# Patient Record
Sex: FEMALE | Race: WHITE | NOT HISPANIC OR LATINO | Employment: UNEMPLOYED | URBAN - METROPOLITAN AREA
[De-identification: names, ages, dates, MRNs, and addresses within clinical notes are randomized per-mention and may not be internally consistent; named-entity substitution may affect disease eponyms.]

---

## 2015-03-25 LAB — PAP-ABSTRACT: NORMAL

## 2019-05-17 ENCOUNTER — OFFICE VISIT (OUTPATIENT)
Dept: FAMILY MEDICINE | Facility: CLINIC | Age: 44
End: 2019-05-17
Payer: COMMERCIAL

## 2019-05-17 VITALS
HEIGHT: 58 IN | BODY MASS INDEX: 35.1 KG/M2 | DIASTOLIC BLOOD PRESSURE: 74 MMHG | HEART RATE: 65 BPM | SYSTOLIC BLOOD PRESSURE: 109 MMHG | WEIGHT: 167.2 LBS | TEMPERATURE: 98.6 F

## 2019-05-17 DIAGNOSIS — F41.1 GENERALIZED ANXIETY DISORDER: ICD-10-CM

## 2019-05-17 DIAGNOSIS — F19.20 CHEMICAL DEPENDENCY (H): ICD-10-CM

## 2019-05-17 DIAGNOSIS — R06.83 SNORING: Primary | ICD-10-CM

## 2019-05-17 DIAGNOSIS — F33.41 RECURRENT MAJOR DEPRESSIVE DISORDER, IN PARTIAL REMISSION (H): ICD-10-CM

## 2019-05-17 LAB
HCG UR QL: NEGATIVE
TSH SERPL DL<=0.005 MIU/L-ACNC: 2.06 MU/L (ref 0.4–4)

## 2019-05-17 RX ORDER — CITALOPRAM HYDROBROMIDE 40 MG/1
40 TABLET ORAL DAILY
Qty: 90 TABLET | Refills: 1 | Status: SHIPPED | OUTPATIENT
Start: 2019-05-17 | End: 2019-05-20

## 2019-05-17 RX ORDER — QUETIAPINE FUMARATE 25 MG/1
25 TABLET, FILM COATED ORAL AT BEDTIME
Qty: 90 TABLET | Refills: 1 | Status: SHIPPED | OUTPATIENT
Start: 2019-05-17 | End: 2019-05-20

## 2019-05-17 ASSESSMENT — ANXIETY QUESTIONNAIRES
2. NOT BEING ABLE TO STOP OR CONTROL WORRYING: MORE THAN HALF THE DAYS
6. BECOMING EASILY ANNOYED OR IRRITABLE: SEVERAL DAYS
5. BEING SO RESTLESS THAT IT IS HARD TO SIT STILL: NOT AT ALL
3. WORRYING TOO MUCH ABOUT DIFFERENT THINGS: MORE THAN HALF THE DAYS
IF YOU CHECKED OFF ANY PROBLEMS ON THIS QUESTIONNAIRE, HOW DIFFICULT HAVE THESE PROBLEMS MADE IT FOR YOU TO DO YOUR WORK, TAKE CARE OF THINGS AT HOME, OR GET ALONG WITH OTHER PEOPLE: NOT DIFFICULT AT ALL
GAD7 TOTAL SCORE: 8
1. FEELING NERVOUS, ANXIOUS, OR ON EDGE: MORE THAN HALF THE DAYS
7. FEELING AFRAID AS IF SOMETHING AWFUL MIGHT HAPPEN: NOT AT ALL

## 2019-05-17 ASSESSMENT — ENCOUNTER SYMPTOMS
PSYCHIATRIC NEGATIVE: 1
EYES NEGATIVE: 1
ENDOCRINE NEGATIVE: 1
DYSPHORIC MOOD: 0
FEVER: 0
FATIGUE: 0
NEUROLOGICAL NEGATIVE: 1
CARDIOVASCULAR NEGATIVE: 1
MUSCULOSKELETAL NEGATIVE: 1
RESPIRATORY NEGATIVE: 1
CONSTITUTIONAL NEGATIVE: 1

## 2019-05-17 ASSESSMENT — PATIENT HEALTH QUESTIONNAIRE - PHQ9
5. POOR APPETITE OR OVEREATING: SEVERAL DAYS
SUM OF ALL RESPONSES TO PHQ QUESTIONS 1-9: 5

## 2019-05-17 ASSESSMENT — MIFFLIN-ST. JEOR: SCORE: 1308.16

## 2019-05-17 NOTE — PATIENT INSTRUCTIONS
"Here is the plan from today's visit    1. Recurrent major depressive disorder, in partial remission (H)  increase  - citalopram (CELEXA) 40 MG tablet; Take 1 tablet (40 mg) by mouth daily  Dispense: 90 tablet; Refill: 1  - TSH with free T4 reflex    2. Generalized anxiety disorder    - citalopram (CELEXA) 40 MG tablet; Take 1 tablet (40 mg) by mouth daily  Dispense: 90 tablet; Refill: 1  - TSH with free T4 reflex    3. Snoring  Needs an appointment   - SLEEP EVALUATION & MANAGEMENT REFERRAL - [facility] / [facility]  [phone] (Age 2 and up); Future  - TSH with free T4 reflex    4. Chemical dependency (H)    - HIV Antigen Antibody Combo  - Hepatitis A Antibody IgG  - Hepatitis A antibody IgM  - Hepatitis B Surface Antibody  - Hepatitis B surface antigen  - Hepatitis C antibody  - Quantiferon TB Gold Plus  - HCG Qualitative Urine (UPT) ([facility])    Recommendations for Insomnia  1. Only go to bed when sleepy.  2. If unable to fall asleep within 20 minutes, get out of bed, go to a different room and read, preferable something uninteresting.  3. Remove or cover up the clock in your bedroom.  3. Avoid caffeine 6 hours before bed.  4. Avoid watching TV one hour before bed time especially not  in bed or leaving the TV on all night.  5. Designate the hour before your bed time as \"worry time.\" Keep a paper to-do list, make a new list every night before going to bed. The goal is for this list to reduce mind racing.   6. Try to exercise 30-60 minutes daily-but avoid vigorous exercise 4-6 hours before bed  7. Try a meditation exercise called the Body Scan  Body Scan Meditation  Lying down now... Breathe in s-l-o-w-l-y and deeply through your nose. Feel your abdomen move outwards as your diaphragm contracts and draws air into your lungs. Your chest should not rise noticeably.  While breathing slowly, direct attention to your left foot. Feel your foot. Curl your toes once to fix " your awareness to it. Now relax...  As you breathe in through your nostrils, slowly scan your left leg from foot to knee, and up through your thigh.   As you breathe out, trace your leg down to your foot. Do this 3 times, then take your mind off your breath and remain with your foot.  Feel the sensations in your foot. Simply become aware of them. Scan your left lower leg. Accept any tension or discomfort. Scan slowly, up through your thigh now.  If thoughts appear, that's fine. Gently come back to your breath, and shift awareness over to your right foot.   Slowly inhale while scanning through your right calf, knee, and thigh... Exhale and scan back down. S-l-o-w-l-y. Now let go of your breath and remain with your foot.  Scan for any sensation in your foot... calf... Thigh... Simply accept all sensations and feel what happens. Relax...  Now focus on your stomach. Feel it r-i-[id number]-i-[id number]-g as you breathe in. Sinking as you exhale. Nice and slow. Your heart probably slows down. This is normal. Remain aware of your stomach, your breath... up and down. Become aware of sensations. Relax...  Now follow the same procedure with your left hand and arm as you did with your leg. You may clench your fist at first to really direct your awareness to your left hand. Breathe...  Now scan up along the length of your arm, to your chest. Then down your right arm to your right hand. Remain there. Breathe. Sense and scan. Relax...  Come back up to your chest. Continue scanning up along your neck and to your face. Gently clench your jaws and release. Feel the sensations in your jaws, your throat. Breathe and scan. Feel how the back of your head rests against the floor.   Scan the top of your head. Relax...  Now detach from all body parts. Breathe... Feel how everything is connected, resting gently on the floor. Just breathe, let any sensation come to you. Accept it as a part of you. Return to your breathing. You are big, sensations are small  parts of you. They fluctuate, come and go.  Just breathe for a minute and feel your body. Then sit up slowly.    Please call or return to clinic if your symptoms don't go away.    Follow up plan  Please make a clinic appointment for follow up with me (ROGE DURANT) in 4  weeks for pap smear.    Thank you for coming to [facility] today.  Lab Testing:  **If you had lab testing today and your results are reassuring or normal they will be mailed to you or sent through [employer] within 7 days.   **If the lab tests need quick action we will call you with the results.  The phone number we will call with results is # [phone] (home) . If this is not the best number please call our clinic and change the number.  Medication Refills:  If you need any refills please call your pharmacy and they will contact us.   If you need to  your refill at a new pharmacy, please contact the new pharmacy directly. The new pharmacy will help you get your medications transferred faster.   Scheduling:  If you have any concerns about today's visit or wish to schedule another appointment please call our office during normal business hours [phone] (8-5:00 M-F)  If a referral was made to a [facility] and you don't get a call from central scheduling please call [phone].  If a Mammogram was ordered for you at [facility] call [phone] to schedule or change your appointment.  If you had an XRay/CT/Ultrasound/MRI ordered the number is [phone] to schedule or change your radiology appointment.   Medical Concerns:  If you have urgent medical concerns please call [phone] at any time of the day.    Roge Durant MD

## 2019-05-17 NOTE — LETTER
May 18, 2019      Kira Booth  [address] [place] [address]        Dear Kira,    Thank you for getting your care at [facility]. Please see below for your test results. Your labs are reassuring.  Your last pap was 5 years ago so you do need one.    Resulted Orders   HIV Antigen Antibody Combo   Result Value Ref Range    HIV Antigen Antibody Combo Nonreactive NR^Nonreactive          Comment:      HIV-1 p24 Ag & HIV-1/HIV-2 Ab Not Detected   Hepatitis A Antibody IgG   Result Value Ref Range    Hepatitis A Antibody IgG Nonreactive NR^Nonreactive      Comment:      This assay cannot be used for the diagnosis of acute HAV infection.   Hepatitis A antibody IgM   Result Value Ref Range    Hepatitis A IgM [facility] Nonreactive NR^Nonreactive      Comment:      IgM anti-HAV not detected. Does not exclude the possibility of recent exposure   to or infection with HAV.     Hepatitis B Surface Antibody   Result Value Ref Range    Hepatitis B Surface Antibody 197.90 (H) <8.00 m[IU]/mL      Comment:      Reactive, Patient is considered to be immune to infection with hepatitis B   when the value is greater than or equal to 12.0 m[IU]/mL.     Hepatitis B surface antigen   Result Value Ref Range    Hep B Surface Agn Nonreactive NR^Nonreactive   Hepatitis C antibody   Result Value Ref Range    Hepatitis C Antibody Nonreactive NR^Nonreactive      Comment:      Assay performance characteristics have not been established for newborns,   infants, and children     TSH with free T4 reflex   Result Value Ref Range    TSH 2.06 0.40 - 4.00 mU/L   HCG Qualitative Urine (UPT) ([facility])   Result Value Ref Range    HCG Qual Urine NEGATIVE Negative   ABSTRACT PAP-NO CHARGE   Result Value Ref Range    PAP-ABSTRACT See Scanned Document       Comment:      result was NIL from Care everywhere       If you have any concerns about these results please call and leave a message for me or send a "[facility]," message to the  clinic.    Sincerely,    Roge Durant MD
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
## 2019-05-17 NOTE — LETTER
May 21, 2019      Kira Booth  [address] [place] [id number]        Dear Kira,    Thank you for getting your care at [facility]. Please see below for your test results. The TB test is negative.    Resulted Orders   HIV Antigen Antibody Combo   Result Value Ref Range    HIV Antigen Antibody Combo Nonreactive NR^Nonreactive          Comment:      HIV-1 p24 Ag & HIV-1/HIV-2 Ab Not Detected   Hepatitis A Antibody IgG   Result Value Ref Range    Hepatitis A Antibody IgG Nonreactive NR^Nonreactive      Comment:      This assay cannot be used for the diagnosis of acute HAV infection.   Hepatitis A antibody IgM   Result Value Ref Range    Hepatitis A IgM [facility] Nonreactive NR^Nonreactive      Comment:      IgM anti-HAV not detected. Does not exclude the possibility of recent exposure   to or infection with HAV.     Hepatitis B Surface Antibody   Result Value Ref Range    Hepatitis B Surface Antibody 197.90 (H) <8.00 m[IU]/mL      Comment:      Reactive, Patient is considered to be immune to infection with hepatitis B   when the value is greater than or equal to 12.0 m[IU]/mL.     Hepatitis B surface antigen   Result Value Ref Range    Hep B Surface Agn Nonreactive NR^Nonreactive   Hepatitis C antibody   Result Value Ref Range    Hepatitis C Antibody Nonreactive NR^Nonreactive      Comment:      Assay performance characteristics have not been established for newborns,   infants, and children     Quantiferon TB Gold Plus   Result Value Ref Range    Quantiferon-TB Gold Plus Result Negative NEG^Negative      Comment:      No interferon gamma response to M.tuberculosis antigens was detected.   Infection with M.tuberculosis is unlikely, however a single negative result   does not exclude infection. In patients at high risk for infection, a second   test should be considered  in accordance with the 2017 ATS/IDSA/CDC Clinical Practice Guidelines for   Diagnosis of Tuberculosis in Adults and Children [Maximn  GILMA et   al.Clin.Infect.Dis. 2017 64(2):111-115].      TB1 Ag minus Nil Value 0.10 IU/mL    TB2 Ag minus Nil Value 0.14 IU/mL    Mitogen minus Nil Result >10.00 IU/mL    Nil Result 0.05 IU/mL   TSH with free T4 reflex   Result Value Ref Range    TSH 2.06 0.40 - 4.00 mU/L   HCG Qualitative Urine (UPT) ([facility])   Result Value Ref Range    HCG Qual Urine NEGATIVE Negative   ABSTRACT PAP-NO CHARGE   Result Value Ref Range    PAP-ABSTRACT See Scanned Document       Comment:      result was NIL from Care everywhere       If you have any concerns about these results please call and leave a message for me or send a [facility] message to the clinic.    Sincerely,    Roge Durant MD

## 2019-05-18 LAB
HAV IGG SER QL IA: NONREACTIVE
HAV IGM SERPL QL IA: NONREACTIVE
HBV SURFACE AB SERPL IA-ACNC: 197.9 M[IU]/ML
HBV SURFACE AG SERPL QL IA: NONREACTIVE
HCV AB SERPL QL IA: NONREACTIVE
HIV 1+2 AB+HIV1 P24 AG SERPL QL IA: NONREACTIVE

## 2019-05-18 ASSESSMENT — ANXIETY QUESTIONNAIRES: GAD7 TOTAL SCORE: 8

## 2019-05-20 ENCOUNTER — TELEPHONE (OUTPATIENT)
Dept: FAMILY MEDICINE | Facility: CLINIC | Age: 44
End: 2019-05-20

## 2019-05-20 DIAGNOSIS — F33.41 RECURRENT MAJOR DEPRESSIVE DISORDER, IN PARTIAL REMISSION (H): ICD-10-CM

## 2019-05-20 DIAGNOSIS — F41.1 GENERALIZED ANXIETY DISORDER: ICD-10-CM

## 2019-05-20 LAB
GAMMA INTERFERON BACKGROUND BLD IA-ACNC: 0.05 IU/ML
M TB IFN-G BLD-IMP: NEGATIVE
M TB IFN-G CD4+ BCKGRND COR BLD-ACNC: >10 IU/ML
MITOGEN IGNF BCKGRD COR BLD-ACNC: 0.1 IU/ML
MITOGEN IGNF BCKGRD COR BLD-ACNC: 0.14 IU/ML

## 2019-05-20 RX ORDER — QUETIAPINE FUMARATE 25 MG/1
25 TABLET, FILM COATED ORAL AT BEDTIME
Qty: 90 TABLET | Refills: 1 | Status: SHIPPED | OUTPATIENT
Start: 2019-05-20 | End: 2023-02-01

## 2019-05-20 RX ORDER — CITALOPRAM HYDROBROMIDE 40 MG/1
40 TABLET ORAL DAILY
Qty: 90 TABLET | Refills: 1 | Status: SHIPPED | OUTPATIENT
Start: 2019-05-20 | End: 2023-02-01

## 2019-05-20 NOTE — TELEPHONE ENCOUNTER
"Request for medication refill:  Meds got sent to the wrong pharmacy, [facility].    Providers if patient needs an appointment and you are willing to give a one month supply please refill for one month and  send a letter/MyChart using \".SMILLIMITEDREFILL\" .smillimited and route chart to \"P SMI \" (Giving one month refill in non controlled medications is strongly recommended before denial)    If refill has been denied, meaning absolutely no refills without visit, please complete the smart phrase \".smirxrefuse\" and route it to the \"P SMI MED REFILLS\"  pool to inform the patient and the pharmacy.    Marina Vega, [facility]    \    "

## 2019-05-20 NOTE — TELEPHONE ENCOUNTER
Medications were sent to incorrect pharmacy. RN sent them to preferred pharmacy.  Alyse Griffiths RN

## 2019-06-05 ENCOUNTER — PRE VISIT (OUTPATIENT)
Dept: SLEEP MEDICINE | Facility: CLINIC | Age: 44
End: 2019-06-05

## 2019-06-05 NOTE — TELEPHONE ENCOUNTER
"  1.  Reason for the visit:  Consult to discuss snoring and possible sleep apnea  2.  Referring provider and clinic name:  [facility]  3.  Previous Sleep Doctor or Pulmonlogist (clinic name)?  None  4.  Records, Procedures, Imaging, and Labs (see below)  No records to obtain        All NOTES from previous office visits that pertain to why they are being seen in the Sleep Center    Previous Sleep Studies, Chest CT, Echos and reports that pertain to why they are seeing Sleep Center    All Sleep records that have been done in the last 2 years that pertain to why they are seeing Sleep Center            Are they being seen for continuation of care for Cpap/Bipap/Avap/Trilogy/Dental Device? None    If yes to above Who and Where was Device issued/currently getting supplies from? na    Are you currently on \"Supplemental Oxygen\" during the day or night?   na                                                                                                                                                      Please remind pt to bring Cpap machine and ask to arrive 15 minutes early to appointment due traffic and congestion                                                 5. Pt Sleep Center Packet received Message left asking pt to arrive 30 minutes early to appointment if no packet received.        Yes: \"please make sure that you bring this to your appointment completed, either the doctor will not see you until this completed or you may be asked to reschedule your appointment.\"     No: mail or email to the pt and explain, \"please make sure that you bring this to your appointment completed, either the doctor will not see you until this completed or you may be asked to reschedule your appointment.\"     ~If pt coming early to fill packet out, ask that they come 30 minutes prior to their appointment~     6. Has the pt's medication list been updated and preferred pharmacy added?     7. Has the allergy list been " "reviewed?    \"Thank you for choosing [facility] and we look forward to seeing you at your upcoming appointment\"     "

## 2019-06-12 ENCOUNTER — OFFICE VISIT (OUTPATIENT)
Dept: SLEEP MEDICINE | Facility: CLINIC | Age: 44
End: 2019-06-12
Attending: FAMILY MEDICINE
Payer: COMMERCIAL

## 2019-06-12 VITALS
RESPIRATION RATE: 16 BRPM | BODY MASS INDEX: 34 KG/M2 | DIASTOLIC BLOOD PRESSURE: 68 MMHG | OXYGEN SATURATION: 94 % | SYSTOLIC BLOOD PRESSURE: 99 MMHG | HEART RATE: 65 BPM | HEIGHT: 58 IN | WEIGHT: 162 LBS

## 2019-06-12 DIAGNOSIS — E66.811 CLASS 1 OBESITY DUE TO EXCESS CALORIES WITHOUT SERIOUS COMORBIDITY WITH BODY MASS INDEX (BMI) OF 33.0 TO 33.9 IN ADULT: ICD-10-CM

## 2019-06-12 DIAGNOSIS — R06.83 SNORING: Primary | ICD-10-CM

## 2019-06-12 DIAGNOSIS — G47.10 HYPERSOMNOLENCE: ICD-10-CM

## 2019-06-12 DIAGNOSIS — E66.09 CLASS 1 OBESITY DUE TO EXCESS CALORIES WITHOUT SERIOUS COMORBIDITY WITH BODY MASS INDEX (BMI) OF 33.0 TO 33.9 IN ADULT: ICD-10-CM

## 2019-06-12 PROCEDURE — 99244 OFF/OP CNSLTJ NEW/EST MOD 40: CPT | Performed by: INTERNAL MEDICINE

## 2019-06-12 RX ORDER — LORATADINE 10 MG/1
10 TABLET ORAL
COMMUNITY
Start: 2019-06-01 | End: 2023-02-01

## 2019-06-12 ASSESSMENT — MIFFLIN-ST. JEOR: SCORE: 1284.58

## 2019-06-12 NOTE — PATIENT INSTRUCTIONS
"MY TREATMENT INFORMATION FOR SLEEP APNEA-  Kira Booth    DOCTOR : Su Yeh  SLEEP CENTER :      MY CONTACT NUMBER:     Am I having a sleep study at a sleep center?  Make sure you have an appointment for the study before you leave!    Am I having a home sleep study?  Watch this video:  [email or web address].com/watch?v=CteI_[email or web address]&[email or web address]  Please verify your insurance coverage with your insurance carrier    Frequently asked questions:  1. What is Obstructive Sleep Apnea (JOYCE)? JOYCE is the most common type of sleep apnea. Apnea means, \"without breath.\"  Apnea is most often caused by narrowing or collapse of the upper airway as muscles relax during sleep.   Almost everyone has occasional apneas. Most people with sleep apnea have had brief interruptions at night frequently for many years.  The severity of sleep apnea is related to how frequent and severe the events are.   2. What are the consequences of JOYCE? Symptoms include: feeling sleepy during the day, snoring loudly, gasping or stopping of breathing, trouble sleeping, and occasionally morning headaches or heartburn at night.  Sleepiness can be serious and even increase the risk of falling asleep while driving. Other health consequences may include development of high blood pressure and other cardiovascular disease in persons who are susceptible. Untreated JOYCE  can contribute to heart disease, stroke and diabetes.   3. What are the treatment options? In most situations, sleep apnea is a lifelong disease that must be managed with daily therapy. Medications are not effective for sleep apnea and surgery is generally not considered until other therapies have been tried. Your treatment is your choice . Continuous Positive Airway (CPAP) works right away and is the therapy that is effective in nearly everyone. An oral device to hold your jaw forward is usually the next most reliable option. Other options include postioning " devices (to keep you off your back), weight loss, and surgery including a tongue pacing device. There is more detail about some of these options below.    Important tips for using CPAP and similar devices   Know your equipment:  CPAP is continuous positive airway pressure that prevents obstructive sleep apnea by keeping the throat from collapsing while you are sleeping. In most cases, the device is  smart  and can slowly self-adjusts if your throat collapses and keeps a record every day of how well you are treated-this information is available to you and your care team.  BPAP is bilevel positive airway pressure that keeps your throat open and also assists each breath with a pressure boost to maintain adequate breathing.  Special kinds of BPAP are used in patients who have inadequate breathing from lung or heart disease. In most cases, the device is  smart  and can slowly self-adjusts to assist breathing. Like CPAP, the device keeps a record of how well you are treated.  Your mask is your connection to the device. You get to choose what feels most comfortable and the staff will help to make sure if fits. Here: are some examples of the different masks that are available:       Key points to remember on your journey with sleep apnea:  1. Sleep study.  PAP devices often need to be adjusted during a sleep study to show that they are effective and adjusted right.  2. Good tips to remember: Try wearing just the mask during a quiet time during the day so your body adapts to wearing it. A humidifier is recommended for comfort in most cases to prevent drying of your nose and throat. Allergy medication from your provider may help you if you are having nasal congestion.  3. Getting settled-in. It takes more than one night for most of us to get used to wearing a mask. Try wearing just the mask during a quiet time during the day so your body adapts to wearing it. A humidifier is recommended for comfort in most cases. Our team  will work with you carefully on the first day and will be in contact within 4 days and again at 2 and 4 weeks for advice and remote device adjustments. Your therapy is evaluated by the device each day.   4. Use it every night. The more you are able to sleep naturally for 7-8 hours, the more likely you will have good sleep and to prevent health risks or symptoms from sleep apnea. Even if you use it 4 hours it helps. Occasionally all of us are unable to use a medical therapy, in sleep apnea, it is not dangerous to miss one night.   5. Communicate. Call our skilled team on the number provided on the first day if your visit for problems that make it difficult to wear the device. Over 2 out of 3 patients can learn to wear the device long-term with help from our team. Remember to call our team or your sleep providers if you are unable to wear the device as we may have other solutions for those who cannot adapt to mask CPAP therapy. It is recommended that you sleep your sleep provider within the first 3 months and yearly after that if you are not having problems.   6. Use it for your health. We encourage use of CPAP masks during daytime quiet periods to allow your face and brain to adapt to the sensation of CPAP so that it will be a more natural sensation to awaken to at night or during naps. This can be very useful during the first few weeks or months of adapting to CPAP though it does not help medically to wear CPAP during wakefulness and  should not be used as a strategy just to meet guidelines.  7. Take care of your equipment. Make sure you clean your mask and tubing using directions every day and that your filter and mask are replaced as recommended or if they are not working.     BESIDES CPAP, WHAT OTHER THERAPIES ARE THERE?    Positioning Device  Positioning devices are generally used when sleep apnea is mild and only occurs on your back.This example shows a pillow that straps around the waist. It may be appropriate  for those whose sleep study shows milder sleep apnea that occurs primarily when lying flat on one's back. Preliminary studies have shown benefit but effectiveness at home may need to be verified by a home sleep test. These devices are generally not covered by medical insurance.  Examples of devices that maintain sleeping on the back to prevent snoring and mild sleep apnea.    Belt type body positioner  Http://NewHive.Xanic/    Electronic reminder  Http://nightshifttherapy.com/  Http://www.[employer].Xanic.au/      Oral Appliance  What is oral appliance therapy?  An oral appliance device fits on your teeth at night like a retainer used after having braces. The device is made by a specialized dentist and requires several visits over 1-2 months before a manufactured device is made to fit your teeth and is adjusted to prevent your sleep apnea. Once an oral device is working properly, snoring should be improved. A home sleep test may be recommended at that time if to determine whether the sleep apnea is adequately treated.       Some things to remember:  -Oral devices are often, but not always, covered by your medical insurance. Be sure to check with your insurance provider.   -If you are referred for oral therapy, you will be given a list of specialized dentists to consider or you may choose to visit the Web site of the American Academy of Dental Sleep Medicine  -Oral devices are less likely to work if you have severe sleep apnea or are extremely overweight.     More detailed information  An oral appliance is a small acrylic device that fits over the upper and lower teeth  (similar to a retainer or a mouth guard). This device slightly moves jaw forward, which moves the base of the tongue forward, opens the airway, improves breathing for effective treat snoring and obstructive sleep apnea in perhaps 7 out of 10 people .  The best working devices are custom-made by a dental device  after a mold is made of the teeth  1, 2, 3.  When is an oral appliance indicated?  Oral appliance therapy is recommended as a first-line treatment for patients with primary snoring, mild sleep apnea, and for patients with moderate sleep apnea who prefer appliance therapy to use of CPAP4, 5. Severity of sleep apnea is determined by sleep testing and is based on the number of respiratory events per hour of sleep.   How successful is oral appliance therapy?  The success rate of oral appliance therapy in patients with mild sleep apnea is 75-80% while in patients with moderate sleep apnea it is 50-70%. The chance of success in patients with severe sleep apnea is 40-50%. The research also shows that oral appliances have a beneficial effect on the cardiovascular health of JOYCE patients at the same magnitude as CPAP therapy7.  Oral appliances should be a second-line treatment in cases of severe sleep apnea, but if not completely successful then a combination therapy utilizing CPAP plus oral appliance therapy may be effective. Oral appliances tend to be effective in a broad range of patients although studies show that the patients who have the highest success are females, younger patients, those with milder disease, and less severe obesity. 3, 6.   Finding a dentist that practices dental sleep medicine  Specific training is available through the American Academy of Dental Sleep Medicine for dentists interested in working in the field of sleep. To find a dentist who is educated in the field of sleep and the use of oral appliances, near you, visit the Web site of the American Academy of Dental Sleep Medicine.    References  1. Jass, et al. Objectively measured vs self-reported compliance during oral appliance therapy for sleep-disordered breathing. Chest 2013; 144(5): 0994-7576.  2. Elkin et al. Objective measurement of compliance during oral appliance therapy for sleep-disordered breathing. Thorax 2013; 68(1): 91-96.  3. Shu et al. Mandibular  advancement devices in 620 men and women with JOYCE and snoring: tolerability and predictors of treatment success. Chest 2004; 125: 9251-1899.  4. Markell et al. Oral appliances for snoring and JOYCE: a review. Sleep 2006; 29: 244-262.  5. Kizzy et al. Oral appliance treatment for JOYCE: an update. J Clin Sleep Med 2014; 10(2): 215-227.  6. Adan et al. Predictors of OSAH treatment outcome. J Dent Res 2007; 86: 8067-8165.      Weight Loss:    Weight loss is a long-term strategy that may improve sleep apnea in some patients.        Surgery:    Surgery for obstructive sleep apnea is considered generally only when other therapies fail to work. Surgery may be discussed with you if you are having a difficult time tolerating CPAP and or when there is an abnormal structure that requires surgical correction.  Nose and throat surgeries often enlarge the airway to prevent collapse.  Most of these surgeries create pain for 1-2 weeks and up to half of the most common surgeries are not effective throughout life.  You should carefully discuss the benefits and drawbacks to surgery with your sleep provider and surgeon to determine if it is the best solution for you.   More information  Surgery for JOYCE is directed at areas that are responsible for narrowing or complete obstruction of the airway during sleep.  There are a wide range of procedures available to enlarge and/or stabilize the airway to prevent blockage of breathing in the three major areas where it can occur: the palate, tongue, and nasal regions.  Successful surgical treatment depends on the accurate identification of the factors responsible for obstructive sleep apnea in each person.  A personalized approach is required because there is no single treatment that works well for everyone.  Because of anatomic variation, consultation with an examination by a sleep surgeon is a critical first step in determining what surgical options are best for each patient.  In some  cases, examination during sedation may be recommended in order to guide the selection of procedures.  Patients will be counseled about risks and benefits as well as the typical recovery course after surgery. Surgery is typically not a cure for a person s JOYCE.  However, surgery will often significantly improve one s JOYCE severity (termed  success rate ).  Even in the absence of a cure, surgery will decrease the cardiovascular risk associated with OSA7; improve overall quality of life8 (sleepiness, functionality, sleep quality, etc).      Palate Procedures:  Patients with JOYCE often have narrowing of their airway in the region of their tonsils and uvula.  The goals of palate procedures are to widen the airway in this region as well as to help the tissues resist collapse.  Modern palate procedure techniques focus on tissue conservation and soft tissue rearrangement, rather than tissue removal.  Often the uvula is preserved in this procedure. Residual sleep apnea is common in patient after pharyngoplasty with an average reduction in sleep apnea events of 33%2.      Tongue Procedures:  ExamWhile patients are awake, the muscles that surround the throat are active and keep this region open for breathing. These muscles relax during sleep, allowing the tongue and other structures to collapse and block breathing.  There are several different tongue procedures available.  Selection of a tongue base procedure depends on characteristics seen on physical exam.  Generally, procedures are aimed at removing bulky tissues in this area or preventing the back of the tongue from falling back during sleep.  Success rates for tongue surgery range from 50-62%3.    Hypoglossal Nerve Stimulation:  Hypoglossal nerve stimulation has recently received approval from the [place] Food and Drug Administration for the treatment of obstructive sleep apnea.  This is based on research showing that the system was safe and effective in treating sleep  apnea6.  Results showed that the median AHI score decreased 68%, from 29.3 to 9.0. This therapy uses an implant system that senses breathing patterns and delivers mild stimulation to airway muscles, which keeps the airway open during sleep.  The system consists of three fully implanted components: a small generator (similar in size to a pacemaker), a breathing sensor, and a stimulation lead.  Using a small handheld remote, a patient turns the therapy on before bed and off upon awakening.    Candidates for this device must be greater than 22 years of age, have moderate to severe JOYCE (AHI between 20-65), BMI less than 32, have tried CPAP/oral appliance without success, and have appropriate upper airway anatomy (determined by a sleep endoscopy performed by Dr. Danielle).    Hypoglossal Nerve Stimulation Pathway:    The sleep surgeon s office will work with the patient through the insurance prior-authorization process (including communications and appeals).    Nasal Procedures:  Nasal obstruction can interfere with nasal breathing during the day and night.  Studies have shown that relief of nasal obstruction can improve the ability of some patients to tolerate positive airway pressure therapy for obstructive sleep apnea1.  Treatment options include medications such as nasal saline, topical corticosteroid and antihistamine sprays, and oral medications such as antihistamines or decongestants. Non-surgical treatments can include external nasal dilators for selected patients. If these are not successful by themselves, surgery can improve the nasal airway either alone or in combination with these other options.      Combination Procedures:  Combination of surgical procedures and other treatments may be recommended, particularly if patients have more than one area of narrowing or persistent positional disease.  The success rate of combination surgery ranges from 66-80%2,3.    References  1. Dewayne JARRELL. The Role of the Nose in  Snoring and Obstructive Sleep Apnoea: An Update.  Eur Arch Otorhinolaryngol. 2011; 268: 1365-73.  2.  Vadim SM; Irineo JA; Grisel JR; Pallanch JF; Prosper MB; Liza SG; Isabelle HINSON. Surgical modifications of the upper airway for obstructive sleep apnea in adults: a systematic review and meta-analysis. SLEEP 2010;33(10):7219-2132. Robert MAN. Hypopharyngeal surgery in obstructive sleep apnea: an evidence-based medicine review.  Arch Otolaryngol Head Neck Surg. 2006 Feb;132(2):206-13.  3. Fadi YH1, Joyce Y, Bret LUDMILA. The efficacy of anatomically based multilevel surgery for obstructive sleep apnea. Otolaryngol Head Neck Surg. 2003 Oct;129(4):327-35.  4. Kezirian E, Goldberg A. Hypopharyngeal Surgery in Obstructive Sleep Apnea: An Evidence-Based Medicine Review. Arch Otolaryngol Head Neck Surg. 2006 Feb;132(2):206-13.  5. Bruce BARRERA et al. Upper-Airway Stimulation for Obstructive Sleep Apnea.  N Engl J Med. 2014 Jan 9;370(2):139-49.  6. Beatriz Y et al. Increased Incidence of Cardiovascular Disease in Middle-aged Men with Obstructive Sleep Apnea. Am J Respir Crit Care Med; 2002 166: 159-165  7. Toro EM et al. Studying Life Effects and Effectiveness of Palatopharyngoplasty (SLEEP) study: Subjective Outcomes of Isolated Uvulopalatopharyngoplasty. Otolaryngol Head Neck Surg. 2011; 144: 623-631.            Your BMI is Body mass index is 33.86 kg/m .  Weight management is a personal decision.  If you are interested in exploring weight loss strategies, the following discussion covers the approaches that may be successful. Body mass index (BMI) is one way to tell whether you are at a healthy weight, overweight, or obese. It measures your weight in relation to your height.  A BMI of 18.5 to 24.9 is in the healthy range. A person with a BMI of 25 to 29.9 is considered overweight, and someone with a BMI of 30 or greater is considered obese. More than two-thirds of American adults are considered overweight or obese.  Being  overweight or obese increases the risk for further weight gain. Excess weight may lead to heart disease and diabetes.  Creating and following plans for healthy eating and physical activity may help you improve your health.  Weight control is part of healthy lifestyle and includes exercise, emotional health, and healthy eating habits. Careful eating habits lifelong are the mainstay of weight control. Though there are significant health benefits from weight loss, long-term weight loss with diet alone may be very difficult to achieve- studies show long-term success with dietary management in less than 10% of people. Attaining a healthy weight may be especially difficult to achieve in those with severe obesity. In some cases, medications, devices and surgical management might be considered.  What can you do?  If you are overweight or obese and are interested in methods for weight loss, you should discuss this with your provider.     Consider reducing daily calorie intake by 500 calories.     Keep a food journal.     Avoiding skipping meals, consider cutting portions instead.    Diet combined with exercise helps maintain muscle while optimizing fat loss. Strength training is particularly important for building and maintaining muscle mass. Exercise helps reduce stress, increase energy, and improves fitness. Increasing exercise without diet control, however, may not burn enough calories to loose weight.       Start walking three days a week 10-20 minutes at a time    Work towards walking thirty minutes five days a week     Eventually, increase the speed of your walking for 1-2 minutes at time    In addition, we recommend that you review healthy lifestyles and methods for weight loss available through the National Institutes of Health patient information sites:  http://win.niddk.nih.gov/publications/index.htm    And look into health and wellness programs that may be available through your health insurance provider,  employer, local community center, or nick club.    Weight management plan: Patient was referred to their PCP to discuss a diet and exercise plan.

## 2019-06-12 NOTE — PROGRESS NOTES
Chief complaint: Consultation requested by Roge Durant MD for the evaluation of possible obstructive sleep apnea    History of Present Illness: 43-year-old female with history of substance abuse.  She is currently in a sober and controlled living situation.  She shares a bedroom with a roommate.  Roommate is waking her up multiple times at night because of her loud snoring.  No observed apneas.  Patient is tired during the day.  She is limited to 2 caffeinated coffees in the morning.  She gets 6 to 8 hours of sleep at night.  Typically going to bed around 10 PM and falling asleep within 30 to 60 minutes.  She gets out of bed around 6 AM.  Sleep is disrupted by pain.  She has chronic back pain.  She tosses and turns.  She does take quetiapine at bedtime.  She finds this helpful for sleep.  She was on mirtazipine for a while.  She thinks she had less hangover with the mirtazipine.  She  gained significant amount of weight when becoming sober.  She is however started to lose again.  She denies waking herself up with gasping or choking.  She usually sleeps on her sides or stomach.  She is not currently working and there is no history of shiftwork in the past.  She denies symptoms of restless legs.  No sleepwalking, sleep talking or dream enactment behavior.  No cataplexy hypnagogic hallucinations or sleep paralysis.    [place] Seepiness Scale:14 (Less than 10 normal)    PERI Total Score: 13    STOP-BANG  Loud Snore   1  Excessively Tired/Sleepy   1  Observed apnea   0  Hypertension   0  BMI> 35 kg/m2   0  Age >50   0  Neck >16 in/40cm   0  Male Gender   0  Total =   2  (0-2 low, 3-4 intermediate, 5-8 high risk of JOYCE)      Past Medical History:   Diagnosis Date     Anxiety      Depressive disorder        No Known Allergies    Current Outpatient Medications   Medication     citalopram (CELEXA) 40 MG tablet     loratadine (CLARITIN) 10 MG tablet     QUEtiapine (SEROQUEL) 25 MG tablet     No current facility-administered  medications for this visit.        Social History     Socioeconomic History     Marital status: Unknown     Spouse name: Not on file     Number of children: Not on file     Years of education: Not on file     Highest education level: Not on file   Occupational History     Not on file   Social Needs     Financial resource strain: Not on file     Food insecurity:     Worry: Not on file     Inability: Not on file     Transportation needs:     Medical: Not on file     Non-medical: Not on file   Tobacco Use     Smoking status: Former Smoker     Types: Cigarettes     Last attempt to quit: 2016     Years since quitting: 3.4     Smokeless tobacco: Never Used   Substance and Sexual Activity     Alcohol use: Not Currently     Comment: quit in recovery     Drug use: Not Currently     Types: Methamphetamines     Comment: in recovery     Sexual activity: Not Currently     Partners: Male     Birth control/protection: IUD   Lifestyle     Physical activity:     Days per week: Not on file     Minutes per session: Not on file     Stress: Not on file   Relationships     Social connections:     Talks on phone: Not on file     Gets together: Not on file     Attends Zoroastrianism service: Not on file     Active member of club or organization: Not on file     Attends meetings of clubs or organizations: Not on file     Relationship status: Not on file     Intimate partner violence:     Fear of current or ex partner: Not on file     Emotionally abused: Not on file     Physically abused: Not on file     Forced sexual activity: Not on file   Other Topics Concern     Not on file   Social History Narrative     Not on file       Family History   Problem Relation Age of Onset     Depression Mother      Substance Abuse Mother      Depression Father      Substance Abuse Father      Hypertension Maternal Grandmother      Substance Abuse Maternal Grandmother      Depression Brother      Substance Abuse Brother      Depression Sister      Substance Abuse  "Sister      Sleep Apnea Maternal Grandfather        Review of Systems: Positve for sinus congestion and back pain, and per HPI, otherwise comprehensive review of systems is negative.    EXAM: Pleasant and alert no distress  BP 99/68   Pulse 58   Resp 16   Ht 1.473 m (4' 10\")   Wt 73.5 kg (162 lb)   SpO2 94%   BMI 33.86 kg/m    HEENT: Normocephalic/atraumatic, pupils are equal, reactive to light, no scleral icterus  Oropharynx: Mallampati 3, tonsillar stage 0 , narrow posterior pharynx  Neck supple no lymphadenopathy, neck circumference 14 inches  Chest: Clear to auscultation bilaterally, no rales or wheezes  Cardiac: Regular rate and rhythm, S1-S2 normal  Abdomen: Obese, soft and nontender, bowel sounds present  Extremities: Warm, well perfused, no cyanosis, clubbing, or edema  Psychiatric: Mood and affect appear normal  Neurologic: No gross focal deficits      ASSESSMENT: 43-year-old female with excessive daytime sleepiness despite adequate sleep time.  Her sleepiness may be disrupted by back pain.  However she has significant snoring and due to her obesity she is at risk for sleep apnea.    PLAN: We reviewed the pathophysiology of obstructive sleep apnea.  We reviewed testing options including home sleep testing and in lab testing.  We also reviewed treatment options including oral appliances, weight loss, and CPAP.  Plan is to proceed with home sleep apnea testing.  If indeed she has moderate to severe sleep apnea recommend a trial of CPAP given her daytime symptoms.  Encouraged her to work on weight loss.  Also encouraged her to discuss medications with the prescribing physician as she may be having some stated with quetiapine and may do better with mirtazapine.  See patient instructions for further details of counseling provided.    Su Yeh M.D.  Pulmonary/Critical Care/Sleep Medicine    [facility]   Fl[address], [address]   [address] [place] [address]   Appointments: [phone]    The above note was dictated using voice recognition software and may include typographical errors. Please contact the author for any clarifications.

## 2019-07-03 ENCOUNTER — TELEPHONE (OUTPATIENT)
Dept: SLEEP MEDICINE | Facility: CLINIC | Age: 44
End: 2019-07-03

## 2019-07-03 NOTE — TELEPHONE ENCOUNTER
Message left for Mai hinojosa [facility] to have patient call and make a PSG appointment. Orders placed by Dr. Yeh because patient has MA and can not have a HST.    Mariam Funes [facility] ~[place]

## 2019-07-08 NOTE — TELEPHONE ENCOUNTER
Patient arrived to clinic 7/8/2019 for HST . Was explained to patient that she has been contacted and left voicemails due to insurance not being able to cover HST. She can have the option to schedule in lab where insurance will cover or sign a self pay waiver today going forward with HST. Patient decided to schedule in lab. Overnight sleep study has been scheduled for July 15. Patient wants to know if she needs not take her Seroquel the night of the study.    I told patient that I will send a msg over to provider and I will get back to her as soon as possible with the answer.    Francesco Ramos  Sleep Clinic Specialist - [place]

## 2019-07-12 ENCOUNTER — TELEPHONE (OUTPATIENT)
Dept: SLEEP MEDICINE | Facility: CLINIC | Age: 44
End: 2019-07-12

## 2019-07-12 NOTE — TELEPHONE ENCOUNTER
Called patient on 7/12/2019 at 2:35 PM and was unable to reach patient as mail box was full.    Need to call patient and let her know that her sleep study for Monday has been cancelled due to an insurance denial. Per Dr. Yeh patient may come back into clinic to discuss other options. Because of this options I have left her follow up appointment on the schedule so that she can have that one to discuss other options.        Mariam Funes [facility] ~[place]

## 2019-07-15 NOTE — TELEPHONE ENCOUNTER
Tried to call patient again at 9:29 AM and mailbox is still full on the mobile . Did call the temporary number which is for [place] [employer] and left a message there also asking for a call back to confirm that the information was received.       Mariam Funes [facility] ~[place]

## 2019-07-26 NOTE — TELEPHONE ENCOUNTER
Patient has appointment today at 1:00 PM.    Mariam Funes [facility] ~[place]

## 2023-02-01 ENCOUNTER — HOSPITAL ENCOUNTER (EMERGENCY)
Facility: CLINIC | Age: 48
Discharge: HOME | End: 2023-02-01
Attending: STUDENT IN AN ORGANIZED HEALTH CARE EDUCATION/TRAINING PROGRAM | Admitting: STUDENT IN AN ORGANIZED HEALTH CARE EDUCATION/TRAINING PROGRAM
Payer: COMMERCIAL

## 2023-02-01 VITALS
HEIGHT: 58 IN | TEMPERATURE: 98.6 F | OXYGEN SATURATION: 96 % | RESPIRATION RATE: 16 BRPM | WEIGHT: 150.5 LBS | HEART RATE: 80 BPM | BODY MASS INDEX: 31.59 KG/M2 | SYSTOLIC BLOOD PRESSURE: 122 MMHG | DIASTOLIC BLOOD PRESSURE: 79 MMHG

## 2023-02-01 DIAGNOSIS — H61.002 PERICHONDRITIS OF AURICLE, LEFT: ICD-10-CM

## 2023-02-01 PROCEDURE — 250N000013 HC RX MED GY IP 250 OP 250 PS 637: Performed by: STUDENT IN AN ORGANIZED HEALTH CARE EDUCATION/TRAINING PROGRAM

## 2023-02-01 PROCEDURE — 99283 EMERGENCY DEPT VISIT LOW MDM: CPT | Performed by: STUDENT IN AN ORGANIZED HEALTH CARE EDUCATION/TRAINING PROGRAM

## 2023-02-01 PROCEDURE — 99284 EMERGENCY DEPT VISIT MOD MDM: CPT | Performed by: STUDENT IN AN ORGANIZED HEALTH CARE EDUCATION/TRAINING PROGRAM

## 2023-02-01 RX ORDER — IBUPROFEN 600 MG/1
600 TABLET, FILM COATED ORAL ONCE
Status: COMPLETED | OUTPATIENT
Start: 2023-02-01 | End: 2023-02-01

## 2023-02-01 RX ORDER — GABAPENTIN 300 MG/1
300 CAPSULE ORAL 4 TIMES DAILY
COMMUNITY

## 2023-02-01 RX ORDER — VENLAFAXINE HYDROCHLORIDE 225 MG/1
225 TABLET, EXTENDED RELEASE ORAL DAILY
COMMUNITY

## 2023-02-01 RX ORDER — CIPROFLOXACIN 750 MG/1
750 TABLET, FILM COATED ORAL 2 TIMES DAILY
Qty: 14 TABLET | Refills: 0 | Status: SHIPPED | OUTPATIENT
Start: 2023-02-01 | End: 2023-02-08

## 2023-02-01 RX ORDER — MIRTAZAPINE 15 MG/1
15 TABLET, FILM COATED ORAL AT BEDTIME
COMMUNITY

## 2023-02-01 RX ADMIN — IBUPROFEN 600 MG: 600 TABLET ORAL at 13:37

## 2023-02-01 NOTE — DISCHARGE INSTRUCTIONS
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  There is an associated pocket of pus which will drain through the opening in your piercing.  Please take your antibiotics as prescribed below.  You can take Tylenol or Motrin as needed for pain    Instructions from your doctor today:  Emergency Department testing is focused on the potential causes of your symptoms that are the most dangerous possibilities, and cannot cover every possibility. Based on the evaluation, it was deemed sufficiently safe to discharge and continue management through the clinics. Thus, follow-up is very important to assess for improvement/worsening, potential further testing, and potential treatment adjustments.     Please make an appointment to follow up with:  - Your Primary Care Provider in 7 days if not improving.  - If you do not have a primary care provider, you can be seen in follow-up and establish care by calling any of the clinics below:     - Primary Care Center (phone: [phone])     - Primary Care / [facility] (phone: [phone])   - Have your clinic provider review the results from today's visit with you again, including any potential follow-up or additional testing that may be needed based on the results. Occasionally, incidental findings are found on later review by radiologists that may need follow-up.     Return to the Emergency Department immediately if you have fever, worsening redness, difficulty opening jaw, voice changes, worsening symptoms, or any other urgent or potentially life-threatening concerns.

## 2023-02-01 NOTE — ED PROVIDER NOTES
ED Provider Note  [facility]      History     Chief Complaint   Patient presents with     Ear Problem     Patient presents with left ear redness, swelling and pain;  present for 5 days. Patient reports 10/10 throbbing/burning pain to outer ear. Patient has had a piercing there for 30 years and does not have a history of problems.     HPI  Kira Booth is a 47-year-old woman with no significant past medical history presents emergency department due to 2 days of worsening left ear swelling and pain.  Notes that she has had an auricular piercing there for the last 10 years or so without any issues, and cannot think of any trauma to the area recently.  No fevers or chills    Past Medical History  Past Medical History:   Diagnosis Date     Anxiety      Depressive disorder      Past Surgical History:   Procedure Laterality Date     BACK SURGERY       XR HIP SURGERY ADY LEFT       ciprofloxacin (CIPRO) 750 MG tablet  gabapentin (NEURONTIN) 300 MG capsule  mirtazapine (REMERON) 15 MG tablet  venlafaxine (EFFEXOR-ER) 225 MG 24 hr tablet      No Known Allergies  Family History  Family History   Problem Relation Age of Onset     Depression Mother      Substance Abuse Mother      Depression Father      Substance Abuse Father      Hypertension Maternal Grandmother      Substance Abuse Maternal Grandmother      Depression Brother      Substance Abuse Brother      Depression Sister      Substance Abuse Sister      Sleep Apnea Maternal Grandfather      Social History   Social History     Tobacco Use     Smoking status: Former     Types: Cigarettes     Quit date: 2016     Years since quittin.0     Smokeless tobacco: Never   Substance Use Topics     Alcohol use: Not Currently     Comment: quit in recovery     Drug use: Not Currently     Types: Methamphetamines     Comment: in recovery      Past medical history, past surgical history, medications, allergies, family history, and social  "history were reviewed with the patient. No additional pertinent items.      A medically appropriate review of systems was performed with pertinent positives and negatives noted in the HPI, and all other systems negative.    Physical Exam   BP: 122/79  Pulse: 80  Temp: 98.6  F (37  C)  Resp: 16  Height: 147.3 cm (4' 10\")  Weight: 68.3 kg (150 lb 8 oz)  SpO2: 96 %  Physical Exam  GEN: Well appearing, non toxic, cooperative  HEENT: normocephalic and atraumatic, PERRLA, EOMI  Ear: Left pinna swollen, tender, red, with mild swelling and tenderness on the posterior aspect as well.  Slightly swollen and tender left posterior auricular lymph node.  Scant amount of pus expressed from one of her 2 piercings in the left upper pinna  CV: well-perfused, normal skin color for ethnicity  PULM: breathing comfortably, in no respiratory distress  ABD: nondistended  EXT: Full range of motion.  No edema.  NEURO: awake, conversant, grossly normal bilateral upper and lower extremity strength & ROM   SKIN: No rashes, ecchymosis, or lacerations  PSYCH: Calm and cooperative, interactive    ED Course, Procedures, & Data       No results found for any visits on 02/01/23.  Medications   ibuprofen (ADVIL/MOTRIN) tablet 600 mg (has no administration in time range)     Labs Ordered and Resulted from Time of ED Arrival to Time of ED Departure - No data to display  No orders to display          Medical Decision Making  The patient's presentation is strongly suggestive of an acute and uncomplicated illness or injury.    The patient's evaluation involved:  history and exam without other MDM data elements    The patient's management involved prescription drug management (including medications given in the ED).      Assessment & Plan    47-year-old female with no significant past medical history presenting with 2 days worth of left pinna tenderness and redness noted to have physical exam findings consistent with auricular perichondritis with a small " amount of purulence.  No definitive exam consistent with underlying abscess.  No history of any trauma suspicion of a perichondrial hematoma.  No evidence of any deep tissue infection at this time.  Will discharge on ciprofloxacin with follow-up with PCP as needed    I have reviewed the nursing notes. I have reviewed the findings, diagnosis, plan and need for follow up with the patient.    No results found for any visits on 02/01/23.    Medications   ibuprofen (ADVIL/MOTRIN) tablet 600 mg (has no administration in time range)       New Prescriptions    CIPROFLOXACIN (CIPRO) 750 MG TABLET    Take 1 tablet (750 mg) by mouth 2 times daily for 7 days       Final diagnoses:   Perichondritis of auricle, left       Lorin Aggarwal MD  [facility] EMERGENCY DEPARTMENT  2/1/2023     Lorin Aggarwal MD  02/01/23 [id number]

## 2023-02-01 NOTE — ED TRIAGE NOTES
Patient presents with left ear redness, swelling and pain;  present for 5 days. Patient reports 10/10 throbbing/burning pain to outer ear. Patient has had a piercing there for 30 years and does not have a history of problems.     Triage Assessment     Row Name 02/01/23 1308       Triage Assessment (Adult)    Airway WDL WDL       Respiratory WDL    Respiratory WDL WDL       Skin Circulation/Temperature WDL    Skin Circulation/Temperature WDL X;all    Skin Circulation no pallor;no mottling;no cyanosis  redness and swelling present    Skin Temperature warm       Cardiac WDL    Cardiac WDL WDL       Peripheral/Neurovascular WDL    Peripheral Neurovascular WDL WDL       Cognitive/Neuro/Behavioral WDL    Cognitive/Neuro/Behavioral WDL WDL